# Patient Record
Sex: FEMALE | Race: BLACK OR AFRICAN AMERICAN | Employment: FULL TIME | URBAN - METROPOLITAN AREA
[De-identification: names, ages, dates, MRNs, and addresses within clinical notes are randomized per-mention and may not be internally consistent; named-entity substitution may affect disease eponyms.]

---

## 2018-06-21 PROBLEM — E66.9 OBESITY (BMI 30.0-34.9): Status: ACTIVE | Noted: 2018-06-21

## 2018-06-21 PROBLEM — Z71.3 DIETARY COUNSELING: Status: ACTIVE | Noted: 2018-06-21

## 2018-06-21 PROBLEM — M25.562 PAIN IN BOTH KNEES: Status: ACTIVE | Noted: 2018-06-21

## 2018-06-21 PROBLEM — R60.0 PEDAL EDEMA: Status: ACTIVE | Noted: 2018-06-21

## 2018-06-21 PROBLEM — R53.83 TIREDNESS: Status: ACTIVE | Noted: 2018-06-21

## 2018-06-21 PROBLEM — Z13.228 SCREENING FOR METABOLIC DISORDER: Status: ACTIVE | Noted: 2018-06-21

## 2018-06-21 PROBLEM — M25.561 PAIN IN BOTH KNEES: Status: ACTIVE | Noted: 2018-06-21

## 2018-06-27 PROBLEM — Z00.00 PHYSICAL EXAM: Status: ACTIVE | Noted: 2018-06-27

## 2018-06-27 PROBLEM — Z23 ENCOUNTER FOR IMMUNIZATION: Status: ACTIVE | Noted: 2018-06-27

## 2018-06-27 PROBLEM — E55.9 VITAMIN D DEFICIENCY: Status: ACTIVE | Noted: 2018-06-27

## 2020-03-04 PROBLEM — E66.01 SEVERE OBESITY (HCC): Status: ACTIVE | Noted: 2020-03-04

## 2020-09-22 ENCOUNTER — HOSPITAL ENCOUNTER (OUTPATIENT)
Dept: MAMMOGRAPHY | Age: 45
Discharge: HOME | End: 2020-09-22
Attending: INTERNAL MEDICINE
Payer: COMMERCIAL

## 2020-09-22 DIAGNOSIS — Z12.31 VISIT FOR SCREENING MAMMOGRAM: ICD-10-CM

## 2020-09-22 PROCEDURE — 77067 SCR MAMMO BI INCL CAD: CPT

## 2022-03-18 PROBLEM — R60.0 PEDAL EDEMA: Status: ACTIVE | Noted: 2018-06-21

## 2022-03-18 PROBLEM — E66.9 OBESITY (BMI 30.0-34.9): Status: ACTIVE | Noted: 2018-06-21

## 2022-03-18 PROBLEM — E66.811 OBESITY (BMI 30.0-34.9): Status: ACTIVE | Noted: 2018-06-21

## 2022-03-19 PROBLEM — E55.9 VITAMIN D DEFICIENCY: Status: ACTIVE | Noted: 2018-06-27

## 2022-03-19 PROBLEM — Z00.00 PHYSICAL EXAM: Status: ACTIVE | Noted: 2018-06-27

## 2022-03-19 PROBLEM — E66.01 SEVERE OBESITY (HCC): Status: ACTIVE | Noted: 2020-03-04

## 2022-03-19 PROBLEM — Z71.3 DIETARY COUNSELING: Status: ACTIVE | Noted: 2018-06-21

## 2022-03-19 PROBLEM — R53.83 TIREDNESS: Status: ACTIVE | Noted: 2018-06-21

## 2022-03-19 PROBLEM — Z13.228 SCREENING FOR METABOLIC DISORDER: Status: ACTIVE | Noted: 2018-06-21

## 2022-03-19 PROBLEM — M25.562 PAIN IN BOTH KNEES: Status: ACTIVE | Noted: 2018-06-21

## 2022-03-19 PROBLEM — Z23 ENCOUNTER FOR IMMUNIZATION: Status: ACTIVE | Noted: 2018-06-27

## 2022-03-19 PROBLEM — M25.561 PAIN IN BOTH KNEES: Status: ACTIVE | Noted: 2018-06-21

## 2022-12-13 ENCOUNTER — OFFICE VISIT (OUTPATIENT)
Dept: ORTHOPEDIC SURGERY | Age: 47
End: 2022-12-13

## 2022-12-13 VITALS — BODY MASS INDEX: 35.99 KG/M2 | WEIGHT: 243 LBS | HEIGHT: 69 IN

## 2022-12-13 DIAGNOSIS — M25.562 LEFT KNEE PAIN, UNSPECIFIED CHRONICITY: Primary | ICD-10-CM

## 2022-12-13 DIAGNOSIS — M25.561 RIGHT KNEE PAIN, UNSPECIFIED CHRONICITY: ICD-10-CM

## 2022-12-13 RX ORDER — IBUPROFEN 800 MG/1
TABLET ORAL
COMMUNITY
Start: 2022-11-27

## 2022-12-13 RX ORDER — TRIAMCINOLONE ACETONIDE 40 MG/ML
40 INJECTION, SUSPENSION INTRA-ARTICULAR; INTRAMUSCULAR ONCE
Status: COMPLETED | OUTPATIENT
Start: 2022-12-13 | End: 2022-12-13

## 2022-12-13 RX ORDER — PREDNISONE 20 MG/1
TABLET ORAL
COMMUNITY
Start: 2022-11-27

## 2022-12-13 RX ORDER — KETOROLAC TROMETHAMINE 10 MG/1
10 TABLET, FILM COATED ORAL 2 TIMES DAILY
COMMUNITY
Start: 2017-02-06

## 2022-12-13 RX ORDER — PHENTERMINE HYDROCHLORIDE 37.5 MG/1
TABLET ORAL
COMMUNITY
Start: 2022-12-05

## 2022-12-13 RX ORDER — MELOXICAM 15 MG/1
TABLET ORAL
COMMUNITY
Start: 2022-11-28 | End: 2022-12-13 | Stop reason: SDUPTHER

## 2022-12-13 RX ORDER — MELOXICAM 15 MG/1
TABLET ORAL
Qty: 30 TABLET | Refills: 0 | Status: SHIPPED | OUTPATIENT
Start: 2022-12-13

## 2022-12-13 RX ORDER — NAPROXEN 375 MG/1
375 TABLET ORAL 2 TIMES DAILY WITH MEALS
COMMUNITY
Start: 2017-02-06

## 2022-12-13 RX ADMIN — TRIAMCINOLONE ACETONIDE 40 MG: 40 INJECTION, SUSPENSION INTRA-ARTICULAR; INTRAMUSCULAR at 14:59

## 2022-12-13 RX ADMIN — TRIAMCINOLONE ACETONIDE 40 MG: 40 INJECTION, SUSPENSION INTRA-ARTICULAR; INTRAMUSCULAR at 15:00

## 2022-12-13 NOTE — PROGRESS NOTES
Name: Marisa Rivera  YOB: 1975  Gender: female  MRN: [id number]      CC: Knee Pain (bilateral)       HPI: Marisa Rivera is a 56 y.o. female who presents with Knee Pain (bilateral)  Ms. Chris Manning is a new patient here today with bilateral knee pain since around Thanksgiving. She was on her feet a lot and wearing heels and she thinks this preceded her pain. She denies any specific injury. She describes her pain being around the joint line and is fairly equal on the right and left knee. She reports some swelling into her ankles after a day of being on her feet. She went to an urgent care recently and had xrays but she did not bring them with today. She is willing to bring the xrays back to our office later this week. She denies any past surgeries on either knee. ROS/Meds/PSH/PMH/FH/SH: I personally reviewed the patients standard intake form. Below are the pertinents    Tobacco:  reports that she has been smoking. She has never used smokeless tobacco.  Diabetes: none  Other: Morbid obesity    Physical Examination:  General: no acute distress  Lungs: breathing easily  CV: regular rhythm by pulse  Right Knee: Negative effusion. Tenderness to palpation medial joint line. Full active and passive range of motion with no pain in the extreme. Negative ligamentous exam x4. Negative Kunal's, bounce home test.  Left knee: Negative effusion. Tenderness to palpation medial joint line. Full active and passive range of motion with no pain in the extreme. Negative ligamentous exam x4. Negative Kunal's, bounce home test.      Imaging:   No films available today for review. Patient reports that she had plain radiographs at an outside facility which showed no acute findings. All imaging interpreted by myself Rio Shirley MD independent of radiology review        Assessment:     ICD-10-CM    1.  Left knee pain, unspecified chronicity  M25.562 meloxicam (MOBIC) 15 MG tablet     Ambulatory referral to Physical Therapy     triamcinolone acetonide (KENALOG-40) injection 40 mg     DRAIN/INJECT LARGE JOINT/BURSA      2. Right knee pain, unspecified chronicity  M25.561 meloxicam (MOBIC) 15 MG tablet     Ambulatory referral to Physical Therapy     triamcinolone acetonide (KENALOG-40) injection 40 mg     DRAIN/INJECT LARGE JOINT/BURSA          Plan: Think it is difficult to discern the source of the patient's knee pain she does have tenderness to palpation the medial joint line however no reproducible symptoms on exam.  I discussed with the patient that I think this could be patellofemoral disorder versus a medial synovial plica. I discussed with the patient conservative treatment options to include corticosteroid injection, continued oral NSAIDs and formal physical therapy. I think it is reasonable to perform a intra-articular corticosteroid injection for diagnostic and therapeutic reasons. I would also recommend formal physical therapy at this time to work on quad strengthening and patellar tracking. If she does not get improvement from corticosteroid injection and formal physical therapy and the next step would be an MRI. The patient elected to proceed with an intraarticular knee injection today. After verbal informed consent was obtained after sterile prep the Bilateral knee  was injected from a anterior lateral approach with 4 cc of 0.25% Marcaine and 1 cc of 40 mg Kenalog. The patient tolerated the procedure well was given postinjection flare precautions. Nura Rousseau NP dictating as a scribe for MD Krupa Stephens.  Ashanti Guerrero MD, [facility]

## 2022-12-14 ENCOUNTER — TELEPHONE (OUTPATIENT)
Dept: ORTHOPEDIC SURGERY | Age: 47
End: 2022-12-14

## 2022-12-14 NOTE — TELEPHONE ENCOUNTER
Got cortisone injection in each knee yesterday. Did not know side effects. She has had headache since yesterday evening and feels it is attributed to the injections. Tylenol has not relieved. Has not taken Meloxicam today b/c her knees feel better. She would like advice. Can she take IBU? Also, she told doctor she would get xrays and drop them off yesterday but she ran errand and then wasn't feeling good. She will get them and drop them off Thursday or Friday.

## 2022-12-15 NOTE — TELEPHONE ENCOUNTER
I spoke with Ms. Lauren Florence today. Her headache has subsided and she is doing well today. Her knee pain has persisted but she was encouraged to give this more time for the medicine to work. We went over NSAID use and she will take either meloxicam or ibuprofen.

## 2023-05-10 ENCOUNTER — OFFICE VISIT (OUTPATIENT)
Dept: GYNECOLOGY | Age: 48
End: 2023-05-10
Payer: COMMERCIAL

## 2023-05-10 VITALS
BODY MASS INDEX: 36.43 KG/M2 | WEIGHT: 246 LBS | DIASTOLIC BLOOD PRESSURE: 78 MMHG | HEIGHT: 69 IN | SYSTOLIC BLOOD PRESSURE: 118 MMHG

## 2023-05-10 DIAGNOSIS — N89.8 VAGINAL DISCHARGE: Primary | ICD-10-CM

## 2023-05-10 DIAGNOSIS — Z12.4 ENCOUNTER FOR PAPANICOLAOU SMEAR FOR CERVICAL CANCER SCREENING: ICD-10-CM

## 2023-05-10 DIAGNOSIS — N94.6 DYSMENORRHEA: ICD-10-CM

## 2023-05-10 DIAGNOSIS — N92.0 MENORRHAGIA WITH REGULAR CYCLE: ICD-10-CM

## 2023-05-10 PROCEDURE — 99203 OFFICE O/P NEW LOW 30 MIN: CPT | Performed by: OBSTETRICS & GYNECOLOGY

## 2023-05-10 RX ORDER — IBUPROFEN 800 MG/1
800 TABLET ORAL EVERY 8 HOURS
Qty: 100 TABLET | Refills: 2 | Status: SHIPPED | OUTPATIENT
Start: 2023-05-10

## 2023-05-10 NOTE — PROGRESS NOTES
HPI  Jose Antonio Ricardo is a 53 y.o. female seen for vaginal discharge. It doesn't have any odors associated with it other than it is copious and she has to wear a pad all the time. She is have regular periods but they are very heavy and painful for 3-4 days. Past Medical History, Past Surgical History, Family history, Social History, and Medications were all reviewed with the patient today and updated as necessary. Current Outpatient Medications   Medication Sig    Ferrous Sulfate (IRON PO) Take by mouth    ibuprofen (ADVIL;MOTRIN) 800 MG tablet TAKE 1 TABLET BY MOUTH TWICE DAILY FOR 7 DAYS    phentermine (ADIPEX-P) 37.5 MG tablet     meloxicam (MOBIC) 15 MG tablet TAKE 1 TABLET BY MOUTH ONCE DAILY FOR 14 DAYS    ketorolac (TORADOL) 10 MG tablet Take 10 mg by mouth 2 times daily (Patient not taking: Reported on 5/10/2023)    predniSONE (DELTASONE) 20 MG tablet TAKE 2 TABLETS BY MOUTH DAILY FOR 7 DAYS (Patient not taking: Reported on 5/10/2023)     No current facility-administered medications for this visit.      Allergies   Allergen Reactions    Tramadol Nausea And Vomiting     Other reaction(s): Vomit    Oxycodone-Acetaminophen Other (See Comments)     Hallucination  Other reaction(s): Hallucinations-Intolerance    Penicillins Hives     Past Medical History:   Diagnosis Date    H/O seasonal allergies     Headache(784.0)      Past Surgical History:   Procedure Laterality Date    TUBAL LIGATION       Family History   Problem Relation Age of Onset    Hypertension Mother     Alzheimer's Disease Father     Cancer Paternal Aunt 36        Breast      Social History     Tobacco Use    Smoking status: Former     Types: Cigarettes    Smokeless tobacco: Never   Substance Use Topics    Alcohol use: Yes     Comment: occ       Social History     Substance and Sexual Activity   Sexual Activity Not Currently    Birth control/protection: Surgical    Comment: BTL     OB History    Para Term  AB Living   3

## 2023-05-13 LAB
A VAGINAE DNA VAG QL NAA+PROBE: ABNORMAL SCORE
BVAB2 DNA VAG QL NAA+PROBE: ABNORMAL SCORE
C ALBICANS DNA VAG QL NAA+PROBE: NEGATIVE
C GLABRATA DNA VAG QL NAA+PROBE: NEGATIVE
MEGA1 DNA VAG QL NAA+PROBE: ABNORMAL SCORE

## 2023-05-15 LAB
A VAGINAE DNA VAG QL NAA+PROBE: ABNORMAL SCORE
BVAB2 DNA VAG QL NAA+PROBE: ABNORMAL SCORE
C ALBICANS DNA VAG QL NAA+PROBE: NEGATIVE
C GLABRATA DNA VAG QL NAA+PROBE: NEGATIVE
C TRACH RRNA SPEC QL NAA+PROBE: NEGATIVE
CANDIDA KRUSEI: NEGATIVE
CANDIDA LUSITANIAE, NAA, 180015: NEGATIVE
CANDIDA PARAPSILOSIS/TROPICALIS: NEGATIVE
MEGA1 DNA VAG QL NAA+PROBE: ABNORMAL SCORE
N GONORRHOEA RRNA SPEC QL NAA+PROBE: NEGATIVE
T VAGINALIS RRNA SPEC QL NAA+PROBE: NEGATIVE

## 2023-05-15 RX ORDER — METRONIDAZOLE 500 MG/1
500 TABLET ORAL 2 TIMES DAILY
Qty: 14 TABLET | Refills: 0 | Status: SHIPPED | OUTPATIENT
Start: 2023-05-15

## 2023-05-15 NOTE — TELEPHONE ENCOUNTER
----- Message from Flora Riggs MD sent at 5/15/2023  2:06 PM EDT -----  VLADIMIR - Flagyl or Metrogel

## 2023-05-19 LAB
CYTOLOGIST CVX/VAG CYTO: NORMAL
CYTOLOGY CVX/VAG DOC THIN PREP: NORMAL
HPV REFLEX: NORMAL
Lab: NORMAL
PATH REPORT.FINAL DX SPEC: NORMAL
STAT OF ADQ CVX/VAG CYTO-IMP: NORMAL

## 2023-05-24 ENCOUNTER — OFFICE VISIT (OUTPATIENT)
Dept: GYNECOLOGY | Age: 48
End: 2023-05-24

## 2023-05-24 VITALS
WEIGHT: 244 LBS | DIASTOLIC BLOOD PRESSURE: 82 MMHG | SYSTOLIC BLOOD PRESSURE: 140 MMHG | BODY MASS INDEX: 36.14 KG/M2 | HEIGHT: 69 IN

## 2023-05-24 DIAGNOSIS — N83.202 CYST OF LEFT OVARY: ICD-10-CM

## 2023-05-24 DIAGNOSIS — N94.6 DYSMENORRHEA: ICD-10-CM

## 2023-05-24 DIAGNOSIS — D50.0 IRON DEFICIENCY ANEMIA DUE TO CHRONIC BLOOD LOSS: ICD-10-CM

## 2023-05-24 DIAGNOSIS — D21.9 FIBROIDS: ICD-10-CM

## 2023-05-24 DIAGNOSIS — N92.0 MENORRHAGIA WITH REGULAR CYCLE: Primary | ICD-10-CM

## 2023-05-24 DIAGNOSIS — N85.2 ENLARGED UTERUS: ICD-10-CM

## 2023-05-24 LAB — HEMOGLOBIN, POC: 9 G/DL

## 2023-05-24 NOTE — PROGRESS NOTES
MILDRED Ya is a 53 y.o. female seen for menorrhagia and anemia. Her periods are regular lasting up to 10 days and extremely heavy. She was dxed with anemia with a hgb of 5. She presents today for US and recheck hgb was 9. She has been taking iron. Past Medical History, Past Surgical History, Family history, Social History, and Medications were all reviewed with the patient today and updated as necessary. Current Outpatient Medications   Medication Sig    metroNIDAZOLE (FLAGYL) 500 MG tablet Take 1 tablet by mouth in the morning and at bedtime    Ferrous Sulfate (IRON PO) Take by mouth    ibuprofen (ADVIL;MOTRIN) 800 MG tablet Take 1 tablet by mouth every 8 (eight) hours    ketorolac (TORADOL) 10 MG tablet Take 10 mg by mouth 2 times daily (Patient not taking: Reported on 5/10/2023)    phentermine (ADIPEX-P) 37.5 MG tablet  (Patient not taking: Reported on 5/24/2023)    predniSONE (DELTASONE) 20 MG tablet TAKE 2 TABLETS BY MOUTH DAILY FOR 7 DAYS (Patient not taking: Reported on 5/10/2023)    meloxicam (MOBIC) 15 MG tablet TAKE 1 TABLET BY MOUTH ONCE DAILY FOR 14 DAYS (Patient not taking: Reported on 5/24/2023)     No current facility-administered medications for this visit. Allergies   Allergen Reactions    Tramadol Nausea And Vomiting     Other reaction(s): Vomit    Oxycodone-Acetaminophen Other (See Comments)     Hallucination  Other reaction(s): Hallucinations-Intolerance    Penicillins Hives     Past Medical History:   Diagnosis Date    BV (bacterial vaginosis)     H/O seasonal allergies     Headache(784.0)      Past Surgical History:   Procedure Laterality Date    TUBAL LIGATION       Family History   Problem Relation Age of Onset    Hypertension Mother     Alzheimer's Disease Father     Cancer Paternal Aunt 36        Breast      Social History     Tobacco Use    Smoking status: Former     Types: Cigarettes    Smokeless tobacco: Never   Substance Use Topics    Alcohol use:  Yes

## 2023-05-25 ENCOUNTER — TELEPHONE (OUTPATIENT)
Dept: OBGYN CLINIC | Age: 48
End: 2023-05-25

## 2023-05-25 NOTE — TELEPHONE ENCOUNTER
LVM to schedule NP Talk with any provider to  discuss Total Abdominal Hysterectomy  (20 week size uterus). Dr. Santana Phelps patient.

## 2025-07-14 ENCOUNTER — HOSPITAL ENCOUNTER (EMERGENCY)
Age: 50
Discharge: HOME | End: 2025-07-14
Payer: COMMERCIAL

## 2025-07-14 ENCOUNTER — APPOINTMENT (OUTPATIENT)
Dept: ULTRASOUND IMAGING | Age: 50
End: 2025-07-14
Payer: COMMERCIAL

## 2025-07-14 VITALS
HEART RATE: 84 BPM | HEIGHT: 69 IN | SYSTOLIC BLOOD PRESSURE: 128 MMHG | WEIGHT: 239 LBS | OXYGEN SATURATION: 100 % | BODY MASS INDEX: 35.4 KG/M2 | TEMPERATURE: 97.7 F | RESPIRATION RATE: 16 BRPM | DIASTOLIC BLOOD PRESSURE: 88 MMHG

## 2025-07-14 DIAGNOSIS — D25.9 UTERINE LEIOMYOMA, UNSPECIFIED LOCATION: ICD-10-CM

## 2025-07-14 DIAGNOSIS — N93.8 DUB (DYSFUNCTIONAL UTERINE BLEEDING): Primary | ICD-10-CM

## 2025-07-14 LAB
ALBUMIN SERPL-MCNC: 3.7 G/DL (ref 3.5–5)
ALBUMIN/GLOB SERPL: 1.2 (ref 1–1.9)
ALP SERPL-CCNC: 50 U/L (ref 35–104)
ALT SERPL-CCNC: 16 U/L (ref 8–45)
ANION GAP SERPL CALC-SCNC: 12 MMOL/L (ref 7–16)
APPEARANCE UR: ABNORMAL
AST SERPL-CCNC: 18 U/L (ref 15–37)
BACTERIA URNS QL MICRO: 0 /HPF
BASOPHILS # BLD: 0.07 K/UL (ref 0–0.2)
BASOPHILS NFR BLD: 1.2 % (ref 0–2)
BILIRUB SERPL-MCNC: 0.3 MG/DL (ref 0–1.2)
BILIRUB UR QL: ABNORMAL
BUN SERPL-MCNC: 7 MG/DL (ref 6–23)
CALCIUM SERPL-MCNC: 9.1 MG/DL (ref 8.8–10.2)
CHLORIDE SERPL-SCNC: 106 MMOL/L (ref 98–107)
CO2 SERPL-SCNC: 22 MMOL/L (ref 20–29)
COLOR UR: ABNORMAL
CREAT SERPL-MCNC: 0.87 MG/DL (ref 0.6–1.1)
DIFFERENTIAL METHOD BLD: ABNORMAL
EOSINOPHIL # BLD: 0.15 K/UL (ref 0–0.8)
EOSINOPHIL NFR BLD: 2.5 % (ref 0.5–7.8)
ERYTHROCYTE [DISTWIDTH] IN BLOOD BY AUTOMATED COUNT: 17.3 % (ref 11.9–14.6)
GLOBULIN SER CALC-MCNC: 3.2 G/DL (ref 2.3–3.5)
GLUCOSE SERPL-MCNC: 91 MG/DL (ref 70–99)
GLUCOSE UR STRIP.AUTO-MCNC: NEGATIVE MG/DL
HCG UR QL: NEGATIVE
HCT VFR BLD AUTO: 32.4 % (ref 35.8–46.3)
HGB BLD-MCNC: 9.6 G/DL (ref 11.7–15.4)
HGB UR QL STRIP: ABNORMAL
IMM GRANULOCYTES # BLD AUTO: 0.01 K/UL (ref 0–0.5)
IMM GRANULOCYTES NFR BLD AUTO: 0.2 % (ref 0–5)
INR PPP: 0.9
KETONES UR QL STRIP.AUTO: 15 MG/DL
LEUKOCYTE ESTERASE UR QL STRIP.AUTO: ABNORMAL
LYMPHOCYTES # BLD: 1.63 K/UL (ref 0.5–4.6)
LYMPHOCYTES NFR BLD: 27.3 % (ref 13–44)
MCH RBC QN AUTO: 25.6 PG (ref 26.1–32.9)
MCHC RBC AUTO-ENTMCNC: 29.6 G/DL (ref 31.4–35)
MCV RBC AUTO: 86.4 FL (ref 82–102)
MONOCYTES # BLD: 0.41 K/UL (ref 0.1–1.3)
MONOCYTES NFR BLD: 6.9 % (ref 4–12)
NEUTS SEG # BLD: 3.69 K/UL (ref 1.7–8.2)
NEUTS SEG NFR BLD: 61.9 % (ref 43–78)
NITRITE UR QL STRIP.AUTO: POSITIVE
NRBC # BLD: 0 K/UL (ref 0–0.2)
OTHER OBSERVATIONS: ABNORMAL
PH UR STRIP: 7 (ref 5–9)
PLATELET # BLD AUTO: 329 K/UL (ref 150–450)
PMV BLD AUTO: 10.3 FL (ref 9.4–12.3)
POTASSIUM SERPL-SCNC: 3.7 MMOL/L (ref 3.5–5.1)
PROT SERPL-MCNC: 6.9 G/DL (ref 6.3–8.2)
PROT UR STRIP-MCNC: 100 MG/DL
PROTHROMBIN TIME: 12.5 SEC (ref 11.3–14.9)
RBC # BLD AUTO: 3.75 M/UL (ref 4.05–5.2)
RBC #/AREA URNS HPF: >100 /HPF
SODIUM SERPL-SCNC: 139 MMOL/L (ref 136–145)
SP GR UR REFRACTOMETRY: 1.02 (ref 1–1.02)
UROBILINOGEN UR QL STRIP.AUTO: 4 EU/DL (ref 0.2–1)
WBC # BLD AUTO: 6 K/UL (ref 4.3–11.1)

## 2025-07-14 PROCEDURE — 76830 TRANSVAGINAL US NON-OB: CPT

## 2025-07-14 PROCEDURE — 80053 COMPREHEN METABOLIC PANEL: CPT

## 2025-07-14 PROCEDURE — 81025 URINE PREGNANCY TEST: CPT

## 2025-07-14 PROCEDURE — 85025 COMPLETE CBC W/AUTO DIFF WBC: CPT

## 2025-07-14 PROCEDURE — 85610 PROTHROMBIN TIME: CPT

## 2025-07-14 PROCEDURE — 81003 URINALYSIS AUTO W/O SCOPE: CPT

## 2025-07-14 PROCEDURE — 99284 EMERGENCY DEPT VISIT MOD MDM: CPT

## 2025-07-14 ASSESSMENT — PAIN - FUNCTIONAL ASSESSMENT
PAIN_FUNCTIONAL_ASSESSMENT: NONE - DENIES PAIN
PAIN_FUNCTIONAL_ASSESSMENT: NONE - DENIES PAIN

## 2025-07-14 ASSESSMENT — LIFESTYLE VARIABLES
HOW MANY STANDARD DRINKS CONTAINING ALCOHOL DO YOU HAVE ON A TYPICAL DAY: PATIENT DOES NOT DRINK
HOW OFTEN DO YOU HAVE A DRINK CONTAINING ALCOHOL: NEVER

## 2025-07-14 NOTE — DISCHARGE INSTRUCTIONS
Rest is much as possible  Follow-up with your OB/GYN and let them know that you were seen and evaluated in the emergency department.    That your workup does show mild anemia with a hemoglobin of 9.6 and hematocrit of 32.4.  Your CHEM profile was okay.  Coags were okay pregnancy was negative, urine showed no sign of bacterial infection.  Your ultrasound does show limited visualization of the endometrium given multiple leiomyomas largest of which is 13 cm in size.  You do have a simple left ovarian cyst with a prominent left adnexal vein which may be related to insufficiency of the left gonadal vein.  But the dysfunctional uterine bleeding is likely secondary to these multiple uterine fibroids with the largest being 13 cm which is quite large.  Make sure to follow-up with your OB/GYN give their office a call tomorrow I know that you are establishing with 1.  To see if they can fit you in sooner to address this.  But you should have your blood counts rechecked in 2 to 3 days to make sure that they are not falling any further.  Continue your multivitamin with iron.  As well as your iron supplement.  Sometimes taking the iron supplement with orange juice will help boost the numbers faster.  If you have any worsening in your condition, you start to notice that you are getting short of breath with exertion.  You start to get lightheaded dizzy or feel fatigued.  Return to the ER right away

## 2025-07-14 NOTE — ED PROVIDER NOTES
Emergency Department Provider Note       [facility] EMERGENCY DEPT   PCP: None, None   Age: 50 y.o.   Sex: female     DISPOSITION Decision To Discharge 07/14/2025 07:46:27 PM    ICD-10-CM    1. DUB (dysfunctional uterine bleeding)  N93.8       2. Uterine leiomyoma, unspecified location  D25.9           Medical Decision Making     50-year-old AA female with a past medical history of iron deficiency anemia, menorrhagia, uterine fibroids, presents emergency room with a chief complaint of heavy vaginal bleeding that started today.  She finished her last menstrual cycle about a week ago.  She states that it was no heavier than normal.  But then all of a sudden while just at work today she felt a gush and noticed vaginal bleeding with some clots.  States that she is gone through about 3 pads over the course of today and about 5 hours.  She states that she has felt a little lightheaded.  And symptoms, consistent with her anemia when its bad in the past.  She denies any chest pain, shortness of breath, dyspnea exertion.  Denies any abdominal pain, pelvic pain, cramping, nausea vomiting or diarrhea.  Denies any recent fever or chills.  Is not sexually active.  And has not been sexually active in about 8 months.  Denies any chance of pregnancy.  Denies any concern for STI.  She states that she does get frequent bacterial vaginosis but she thinks its due to some soaps that she uses that changes the pH.    Patient seen and evaluated in the emergency department.  Will obtain CBC, CMP, coags, urinalysis, and pelvic ultrasound.  Working diagnosis of dysfunctional uterine bleeding, uterine fibroids, anemia, iron deficiency anemia, severe anemia.    Patient's workup is back.  CBC shows RBCs 3.75, H&H 9.6 32.4.  CHEM profile reassuring.  Urinalysis secondary to vaginal bleeding shows protein at 100, ketones of 15, large bilirubin, moderate blood, positive nitrites, leuk esterase large, greater than 100 red blood cells, no white cells, no

## 2025-07-14 NOTE — ED TRIAGE NOTES
Patient arrives ambulatory to triage with complaint of heavy vaginal bleeding and generalized weakness. Bleeding started this afternoon, denies pain, denies pregnancy. LMP was last week.

## 2025-07-23 ENCOUNTER — OFFICE VISIT (OUTPATIENT)
Dept: OBGYN CLINIC | Age: 50
End: 2025-07-23
Payer: COMMERCIAL

## 2025-07-23 VITALS — SYSTOLIC BLOOD PRESSURE: 112 MMHG | DIASTOLIC BLOOD PRESSURE: 70 MMHG

## 2025-07-23 DIAGNOSIS — Z12.11 ENCOUNTER FOR SCREENING COLONOSCOPY: ICD-10-CM

## 2025-07-23 DIAGNOSIS — N89.8 VAGINAL DISCHARGE: ICD-10-CM

## 2025-07-23 DIAGNOSIS — Z11.8 SCREENING EXAMINATION FOR PARASITIC INFECTION: ICD-10-CM

## 2025-07-23 DIAGNOSIS — Z12.4 SCREENING FOR CERVICAL CANCER: ICD-10-CM

## 2025-07-23 DIAGNOSIS — Z11.3 SCREENING FOR STDS (SEXUALLY TRANSMITTED DISEASES): ICD-10-CM

## 2025-07-23 DIAGNOSIS — Z12.31 SCREENING MAMMOGRAM FOR BREAST CANCER: ICD-10-CM

## 2025-07-23 DIAGNOSIS — Z11.51 SCREENING FOR HUMAN PAPILLOMAVIRUS (HPV): ICD-10-CM

## 2025-07-23 DIAGNOSIS — Z01.419 WELL WOMAN EXAM WITH ROUTINE GYNECOLOGICAL EXAM: Primary | ICD-10-CM

## 2025-07-23 DIAGNOSIS — Z75.8 DOES NOT HAVE PRIMARY CARE PROVIDER: ICD-10-CM

## 2025-07-23 LAB — T PALLIDUM AB SER QL IA: NONREACTIVE

## 2025-07-23 PROCEDURE — 99396 PREV VISIT EST AGE 40-64: CPT | Performed by: NURSE PRACTITIONER

## 2025-07-23 PROCEDURE — 99459 PELVIC EXAMINATION: CPT | Performed by: NURSE PRACTITIONER

## 2025-07-23 NOTE — PROGRESS NOTES
CC:  Annual     HPI:  50 y.o. NEW patient,  presents today for a routine gynecological examination and to establish care with our office. Patient's last menstrual period was 2025 (approximate).    The patient former patient of [facility] with last visit on 2024.     In reviewing previous note from [facility], She has no breast complaints or urinary complaints. She uses tubal ligation method for contraception.  She complains of clear/white thin vaginal discharge that has been present for many months. She thinks that it maybe from her fibroid. She denies vaginal itching, odor or pain.   She has a known large fibroid. She does not want to have surgery for it. It was estimated in May of last year that her uterus is about the size 20 week. Fibroid interferes with her clothes and working out.   Wet prep and Gc/C was collected which did reveal clue cells and she was treated with Metrogel.     She reports experiencing a \"thin, clear, watery\" vaginal discharge for the past month. She denies fevers, chills, vaginal itching, odor, urinary frequency, urgency or dysuria.   States having regular bowel movements and denies constipation.     She reports known fibroids and states \"Doctors have talked with me about a hysterectomy before but I do not want to do this.\"   She reports being seen in ER on  ( 9 days ago) due to vaginal bleeding. In reviewing ER note, She finished her last menstrual cycle about a week ago. She states that it was no heavier than normal. But then all of a sudden while just at work today she felt a gush and noticed vaginal bleeding with some clots. States that she is gone through about 3 pads over the course of today and about 5 hours. She states that she has felt a little lightheaded. And symptoms, consistent with her anemia when its bad in the past. She denies any chest pain, shortness of breath, dyspnea exertion. Denies any abdominal pain, pelvic pain, cramping, nausea vomiting or

## 2025-07-24 LAB
A VAGINAE DNA VAG QL NAA+PROBE: ABNORMAL SCORE
BVAB2 DNA VAG QL NAA+PROBE: ABNORMAL SCORE
C ALBICANS DNA VAG QL NAA+PROBE: NEGATIVE
C GLABRATA DNA VAG QL NAA+PROBE: NEGATIVE
HBV SURFACE AG SER QL: NONREACTIVE
HCV AB SER QL: NONREACTIVE
HIV 1+2 AB+HIV1 P24 AG SERPL QL IA: NONREACTIVE
HIV 1/2 RESULT COMMENT: NORMAL
MEGA1 DNA VAG QL NAA+PROBE: ABNORMAL SCORE
SPECIMEN SOURCE: ABNORMAL

## 2025-07-25 RX ORDER — METRONIDAZOLE 500 MG/1
500 TABLET ORAL 2 TIMES DAILY
Qty: 14 TABLET | Refills: 0 | Status: SHIPPED | OUTPATIENT
Start: 2025-07-25 | End: 2025-08-01

## 2025-07-26 LAB
C TRACH RRNA CVX QL NAA+PROBE: NEGATIVE
COLLECTION METHOD: NORMAL
CYTOLOGIST CVX/VAG CYTO: NORMAL
CYTOLOGY CVX/VAG DOC THIN PREP: NORMAL
DATE OF LMP: NORMAL
HPV APTIMA: NEGATIVE
HPV GENOTYPE REFLEX: NORMAL
Lab: NORMAL
N GONORRHOEA RRNA CVX QL NAA+PROBE: NEGATIVE
OTHER PT INFO: NORMAL
PAP SOURCE: NORMAL
PATH REPORT.FINAL DX SPEC: NORMAL
PREV CYTO INFO: NEGATIVE
PREV TREATMENT RESULTS: NORMAL
PREV TREATMENT: NORMAL
STAT OF ADQ CVX/VAG CYTO-IMP: NORMAL
T VAGINALIS RRNA SPEC QL NAA+PROBE: NEGATIVE

## 2025-08-12 ENCOUNTER — TELEPHONE (OUTPATIENT)
Dept: OBGYN CLINIC | Age: 50
End: 2025-08-12

## 2025-08-12 RX ORDER — METRONIDAZOLE 7.5 MG/G
1 GEL VAGINAL DAILY
Qty: 70 G | Refills: 0 | Status: SHIPPED | OUTPATIENT
Start: 2025-08-12 | End: 2025-08-17

## 2025-08-26 ENCOUNTER — PROCEDURE VISIT (OUTPATIENT)
Dept: OBGYN CLINIC | Age: 50
End: 2025-08-26
Payer: COMMERCIAL

## 2025-08-26 ENCOUNTER — OFFICE VISIT (OUTPATIENT)
Dept: OBGYN CLINIC | Age: 50
End: 2025-08-26
Payer: COMMERCIAL

## 2025-08-26 VITALS
WEIGHT: 238 LBS | HEIGHT: 69 IN | SYSTOLIC BLOOD PRESSURE: 110 MMHG | BODY MASS INDEX: 35.25 KG/M2 | DIASTOLIC BLOOD PRESSURE: 72 MMHG

## 2025-08-26 DIAGNOSIS — N92.0 MENORRHAGIA WITH REGULAR CYCLE: Primary | ICD-10-CM

## 2025-08-26 DIAGNOSIS — D21.9 FIBROIDS: Primary | ICD-10-CM

## 2025-08-26 DIAGNOSIS — D25.2 INTRAMURAL AND SUBSEROUS LEIOMYOMA OF UTERUS: ICD-10-CM

## 2025-08-26 DIAGNOSIS — D50.0 ANEMIA DUE TO CHRONIC BLOOD LOSS: ICD-10-CM

## 2025-08-26 DIAGNOSIS — D25.1 INTRAMURAL AND SUBSEROUS LEIOMYOMA OF UTERUS: ICD-10-CM

## 2025-08-26 PROCEDURE — 76830 TRANSVAGINAL US NON-OB: CPT | Performed by: OBSTETRICS & GYNECOLOGY

## 2025-08-26 PROCEDURE — 99215 OFFICE O/P EST HI 40 MIN: CPT | Performed by: OBSTETRICS & GYNECOLOGY

## 2025-08-26 RX ORDER — MEDROXYPROGESTERONE ACETATE 10 MG
10 TABLET ORAL 2 TIMES DAILY
Qty: 60 TABLET | Refills: 2 | Status: SHIPPED | OUTPATIENT
Start: 2025-08-26 | End: 2026-08-26

## 2025-08-26 RX ORDER — ACETAMINOPHEN 325 MG/1
650 TABLET ORAL EVERY 6 HOURS PRN
COMMUNITY

## 2025-08-26 RX ORDER — TRANEXAMIC ACID 650 MG/1
1300 TABLET ORAL 3 TIMES DAILY PRN
Qty: 30 TABLET | Refills: 12 | Status: SHIPPED | OUTPATIENT
Start: 2025-08-26

## 2025-08-26 RX ORDER — M-VIT,TX,IRON,MINS/CALC/FOLIC 27MG-0.4MG
1 TABLET ORAL DAILY
COMMUNITY

## 2025-08-27 ENCOUNTER — TELEPHONE (OUTPATIENT)
Dept: OBGYN CLINIC | Age: 50
End: 2025-08-27

## 2025-08-29 PROBLEM — D25.1 INTRAMURAL LEIOMYOMA OF UTERUS: Status: ACTIVE | Noted: 2025-08-28

## 2025-08-29 PROBLEM — D50.0 CHRONIC BLOOD LOSS ANEMIA: Status: ACTIVE | Noted: 2025-08-28
